# Patient Record
Sex: MALE | Race: OTHER | HISPANIC OR LATINO | URBAN - METROPOLITAN AREA
[De-identification: names, ages, dates, MRNs, and addresses within clinical notes are randomized per-mention and may not be internally consistent; named-entity substitution may affect disease eponyms.]

---

## 2021-12-09 ENCOUNTER — OFFICE (OUTPATIENT)
Dept: URBAN - METROPOLITAN AREA CLINIC 79 | Facility: CLINIC | Age: 56
End: 2021-12-09

## 2021-12-09 VITALS
HEIGHT: 65 IN | DIASTOLIC BLOOD PRESSURE: 83 MMHG | HEART RATE: 62 BPM | SYSTOLIC BLOOD PRESSURE: 138 MMHG | TEMPERATURE: 96.9 F | WEIGHT: 165 LBS

## 2021-12-09 DIAGNOSIS — R07.89 OTHER CHEST PAIN: ICD-10-CM

## 2021-12-09 DIAGNOSIS — K62.5 HEMORRHAGE OF ANUS AND RECTUM: ICD-10-CM

## 2021-12-09 DIAGNOSIS — K92.1 MELENA: ICD-10-CM

## 2021-12-09 PROCEDURE — 99204 OFFICE O/P NEW MOD 45 MIN: CPT | Performed by: PHYSICIAN ASSISTANT

## 2021-12-09 NOTE — SERVICEHPINOTES
CHARLY POLLARD   is a   56   year old white  male who is here    for
BRBPR seen on wipe and in stool x 1 month. He has intermittent events of painless BRBPRP that occurs every few days Last event 2 days ago. He is concerned about "hemorrhoids." He has daily BMs 1x/day, BSS type 4 predominately: No pain with defecation. Last colonoscopy at screening age 49 in [facility]. Denies fevers, dyspnea, n/v, abdominal pain, change in BMs, diarrhea, constipation, melena, weight loss. No other complaints.     zachariah Stahl mentions "chest pain" on ROS that is a new symptom with plan to have cardiac consult. zachariah soni

## 2021-12-22 ENCOUNTER — OFFICE (OUTPATIENT)
Dept: URBAN - METROPOLITAN AREA CLINIC 79 | Facility: CLINIC | Age: 56
End: 2021-12-22

## 2021-12-22 PROCEDURE — 00031: CPT | Performed by: INTERNAL MEDICINE
